# Patient Record
Sex: MALE | Race: WHITE | HISPANIC OR LATINO | Employment: PART TIME | RURAL
[De-identification: names, ages, dates, MRNs, and addresses within clinical notes are randomized per-mention and may not be internally consistent; named-entity substitution may affect disease eponyms.]

---

## 2024-04-23 ENCOUNTER — OFFICE VISIT (OUTPATIENT)
Dept: FAMILY MEDICINE CLINIC | Facility: CLINIC | Age: 29
End: 2024-04-23
Payer: COMMERCIAL

## 2024-04-23 VITALS
BODY MASS INDEX: 26.92 KG/M2 | DIASTOLIC BLOOD PRESSURE: 84 MMHG | WEIGHT: 177.6 LBS | OXYGEN SATURATION: 98 % | HEART RATE: 88 BPM | HEIGHT: 68 IN | TEMPERATURE: 98.2 F | SYSTOLIC BLOOD PRESSURE: 120 MMHG | RESPIRATION RATE: 18 BRPM

## 2024-04-23 DIAGNOSIS — J30.1 SEASONAL ALLERGIC RHINITIS DUE TO POLLEN: ICD-10-CM

## 2024-04-23 DIAGNOSIS — L91.8 SKIN TAG: ICD-10-CM

## 2024-04-23 DIAGNOSIS — J02.9 SORE THROAT: Primary | ICD-10-CM

## 2024-04-23 DIAGNOSIS — E66.3 OVERWEIGHT: ICD-10-CM

## 2024-04-23 DIAGNOSIS — Z76.89 ENCOUNTER TO ESTABLISH CARE: ICD-10-CM

## 2024-04-23 LAB
EXPIRATION DATE: NORMAL
INTERNAL CONTROL: NORMAL
Lab: NORMAL
S PYO AG THROAT QL: NEGATIVE

## 2024-04-23 PROCEDURE — 87880 STREP A ASSAY W/OPTIC: CPT | Performed by: NURSE PRACTITIONER

## 2024-04-23 PROCEDURE — 99204 OFFICE O/P NEW MOD 45 MIN: CPT | Performed by: NURSE PRACTITIONER

## 2024-04-23 RX ORDER — LORATADINE 10 MG/1
10 TABLET ORAL DAILY
Qty: 30 TABLET | Refills: 2 | Status: SHIPPED | OUTPATIENT
Start: 2024-04-23

## 2024-04-23 NOTE — PROGRESS NOTES
"    Office Note     Name: Oliverio Arreguin    : 1995     MRN: [id number]     Chief Complaint  Sore Throat    Subjective     History of Present Illness:  Oliverio Arreguin is a 28 y.o. male who presents today to [facility] care with complaints of sore throat. Patient notes several weeks of mild nasal congestion, sore throat, sneezing, itchy watery eyes. He states his symptoms did improve with daily Allegra but he is not taking consistently. His chief complaint is sore throat.  Patient also has interest in having removal of a skin tag that is very large, hanging from the medial aspect of his left upper extremity.  He states it has been there for several years, never been removed previously.  He has no further complaints or concerns    Review of Systems   Constitutional:  Negative for chills, fatigue and fever.   HENT:  Positive for postnasal drip, sneezing and sore throat.    Eyes:  Positive for itching. Negative for redness.   Respiratory:  Negative for cough, chest tightness and wheezing.    Cardiovascular:  Negative for chest pain, palpitations and leg swelling.   Gastrointestinal:  Negative for abdominal pain, constipation, diarrhea, nausea and vomiting.   Endocrine: Negative for polydipsia and polyuria.   Genitourinary:  Negative for difficulty urinating, frequency and hematuria.   Musculoskeletal:  Negative for myalgias.   Skin:  Positive for skin lesions.   Neurological:  Negative for dizziness, light-headedness and headache.       Objective     Past Medical History:   Diagnosis Date    Allergic      History reviewed. No pertinent surgical history.  History reviewed. No pertinent family history.    Vital Signs  /84 (BP Location: Left arm, Patient Position: Sitting, Cuff Size: Adult)   Pulse 88   Temp 98.2 °F (36.8 °C) (Temporal)   Resp 18   Ht 172.7 cm (68\")   Wt 80.6 kg (177 lb 9.6 oz)   SpO2 98%   BMI 27.00 kg/m²   Estimated body mass index is 27 kg/m² as calculated from the following:    Height as " "of this encounter: 172.7 cm (68\").    Weight as of this encounter: 80.6 kg (177 lb 9.6 oz).  Facility age limit for growth %zaynab is 20 years.    Physical Exam  Vitals reviewed.   Constitutional:       Appearance: Normal appearance.   HENT:      Head: Normocephalic and atraumatic.      Right Ear: Tympanic membrane, ear canal and external ear normal.      Left Ear: Tympanic membrane, ear canal and external ear normal.      Nose:      Right Turbinates: Swollen and pale.      Left Turbinates: Swollen and pale.      Mouth/Throat:      Pharynx: Oropharynx is clear. Posterior oropharyngeal erythema present.   Eyes:      Conjunctiva/sclera: Conjunctivae normal.      Pupils: Pupils are equal, round, and reactive to light.   Cardiovascular:      Rate and Rhythm: Normal rate and regular rhythm.      Pulses: Normal pulses.      Heart sounds: Normal heart sounds.   Pulmonary:      Effort: Pulmonary effort is normal.      Breath sounds: Normal breath sounds.   Abdominal:      General: Bowel sounds are normal.      Palpations: Abdomen is soft.   Musculoskeletal:      Cervical back: Neck supple.   Skin:     General: Skin is warm and dry.      Comments: Large skin tag noted at medial aspect of left upper extremity.   Neurological:      Mental Status: He is alert and oriented to person, place, and time.   Psychiatric:         Mood and Affect: Mood normal.         Behavior: Behavior normal.         Thought Content: Thought content normal.         Judgment: Judgment normal.          POCT Results (if applicable):  Results for orders placed or performed in visit on 04/23/24   POC Rapid Strep A    Specimen: Swab   Result Value Ref Range    Rapid Strep A Screen Negative Negative, VALID, INVALID, Not Performed    Internal Control Passed Passed    Lot Number [id number]     Expiration Date 11,022,026        Skin Tag Removal    Date/Time: 4/23/2024 3:57 PM    Performed by: Rula Nuno APRN  Authorized by: Rula Nuno APRN  " Preparation: Patient was prepped and draped in the usual sterile fashion.  Local anesthesia used: no    Anesthesia:  Local anesthesia used: no    Sedation:  Patient sedated: no    Patient tolerance: patient tolerated the procedure well with no immediate complications          Assessment and Plan     Diagnoses and all orders for this visit:    1. Sore throat (Primary)  -     POC Rapid Strep A    2. Overweight  Assessment & Plan:  Patient's (Body mass index is 27 kg/m².) indicates that they are overweight with health conditions that include none . Weight is unchanged. BMI is is above average; BMI management plan is completed. We discussed portion control and increasing exercise.       3. Seasonal allergic rhinitis due to pollen  Assessment & Plan:  Patient notes several weeks of mild nasal congestion, sore throat, sneezing, itchy watery eyes.  He states his symptoms did improve with daily Allegra but he is not taking consistently.  His chief complaint is sore throat.  He has tested negative for strep pharyngitis in office today.  Patient advised to start taking Allegra daily for the next 3 to 4 weeks during seasonal transition change and high pollen counts.  Also discussed benefits of nasal steroid Flonase or saline nasal spray.  Patient may find analgesia from sore throat through over-the-counter numbing agent such as Chloraseptic.    Orders:  -     loratadine (Claritin) 10 MG tablet; Take 1 tablet by mouth Daily.  Dispense: 30 tablet; Refill: 2    4. Encounter to establish care    5. Skin tag  Assessment & Plan:  Patient skin tag removed, tolerated procedure well.  Silver nitrate applied at removal site, covered with Band-Aid.      Other orders  -     Skin Tag Removal      BMI is >= 25 and <30. (Overweight) The following options were offered after discussion;: exercise counseling/recommendations and nutrition counseling/recommendations        Vaccine Counseling:  “Discussed risks/benefits to vaccination, reviewed  components of the vaccine, discussed VIS, discussed informed consent, informed consent obtained. Patient/Parent was allowed to accept or refuse vaccine. Questions answered to satisfactory state of patient/Parent. We reviewed typical age appropriate and seasonally appropriate vaccinations. Reviewed immunization history and updated state vaccination form as needed. Patient was counseled on Influenza  Tdap    Advanced Care Planning:   Patient does not have an advance directive, declines further assistance.    Follow Up  No follow-ups on file.    Rula Nuno, APRN

## 2024-04-23 NOTE — ASSESSMENT & PLAN NOTE
Patient skin tag removed, tolerated procedure well.  Silver nitrate applied at removal site, covered with Band-Aid.

## 2024-04-23 NOTE — ASSESSMENT & PLAN NOTE
Patient's (Body mass index is 27 kg/m².) indicates that they are overweight with health conditions that include none . Weight is unchanged. BMI is is above average; BMI management plan is completed. We discussed portion control and increasing exercise.

## 2024-04-23 NOTE — ASSESSMENT & PLAN NOTE
Patient notes several weeks of mild nasal congestion, sore throat, sneezing, itchy watery eyes.  He states his symptoms did improve with daily Allegra but he is not taking consistently.  His chief complaint is sore throat.  He has tested negative for strep pharyngitis in office today.  Patient advised to start taking Allegra daily for the next 3 to 4 weeks during seasonal transition change and high pollen counts.  Also discussed benefits of nasal steroid Flonase or saline nasal spray.  Patient may find analgesia from sore throat through over-the-counter numbing agent such as Chloraseptic.

## 2024-08-02 ENCOUNTER — OFFICE VISIT (OUTPATIENT)
Dept: FAMILY MEDICINE CLINIC | Facility: CLINIC | Age: 29
End: 2024-08-02
Payer: COMMERCIAL

## 2024-08-02 VITALS
SYSTOLIC BLOOD PRESSURE: 110 MMHG | BODY MASS INDEX: 27.13 KG/M2 | TEMPERATURE: 98.6 F | WEIGHT: 179 LBS | RESPIRATION RATE: 18 BRPM | OXYGEN SATURATION: 98 % | HEIGHT: 68 IN | DIASTOLIC BLOOD PRESSURE: 82 MMHG | HEART RATE: 80 BPM

## 2024-08-02 DIAGNOSIS — J30.1 SEASONAL ALLERGIC RHINITIS DUE TO POLLEN: ICD-10-CM

## 2024-08-02 DIAGNOSIS — J02.9 SORE THROAT: Primary | ICD-10-CM

## 2024-08-02 LAB
EXPIRATION DATE: NORMAL
INTERNAL CONTROL: NORMAL
Lab: NORMAL
S PYO AG THROAT QL: NEGATIVE

## 2024-08-02 PROCEDURE — 87880 STREP A ASSAY W/OPTIC: CPT | Performed by: NURSE PRACTITIONER

## 2024-08-02 PROCEDURE — 99213 OFFICE O/P EST LOW 20 MIN: CPT | Performed by: NURSE PRACTITIONER

## 2024-08-02 NOTE — PROGRESS NOTES
"    Office Note     Name: Oliverio Arreguin    : 1995     MRN: [id number]     Chief Complaint  Sore Throat    Subjective     History of Present Illness:  Oliverio Arreguin is a 28 y.o. male who presents today for complaints of sore throat.  He notes several weeks of sore throat, states that by the end of his work shift where he has to talk frequently over loud noises he feels as though his throat is very strained.  Patient states he does feel as though his he has had some recent postnasal drip.  Patient presented for the same complaints approximately 3 months ago at which time they responded very well to use of daily antihistamine and nasal steroid Flonase.  During that episode patient also was experiencing mild nasal congestion, sneezing and itchy watery eyes.  He does not have those accompanying symptoms during this episode.  She has not continued to utilize daily patient denies any hoarseness or loss of voice.  No fever, chills or fatigue.  He has no further complaints or concerns    Review of Systems   Constitutional:  Negative for appetite change, chills, fatigue and fever.   HENT:  Positive for sore throat. Negative for congestion and trouble swallowing.    Respiratory:  Positive for cough. Negative for chest tightness, shortness of breath and wheezing.    Cardiovascular:  Negative for chest pain, palpitations and leg swelling.   Gastrointestinal:  Negative for constipation, diarrhea, nausea, vomiting and GERD.   Endocrine: Negative for polydipsia and polyuria.   Neurological:  Negative for dizziness, weakness, light-headedness and headache.       Objective     Past Medical History:   Diagnosis Date    Allergic      History reviewed. No pertinent surgical history.  History reviewed. No pertinent family history.    Vital Signs  /82 (BP Location: Left arm, Patient Position: Sitting, Cuff Size: Adult)   Pulse 80   Temp 98.6 °F (37 °C) (Temporal)   Resp 18   Ht 172.7 cm (68\")   Wt 81.2 kg (179 lb)   SpO2 " "98%   BMI 27.22 kg/m²   Estimated body mass index is 27.22 kg/m² as calculated from the following:    Height as of this encounter: 172.7 cm (68\").    Weight as of this encounter: 81.2 kg (179 lb).  Facility age limit for growth %zaynab is 20 years.    Physical Exam  Constitutional:       Appearance: Normal appearance.   HENT:      Head: Normocephalic and atraumatic.      Right Ear: Ear canal and external ear normal.      Left Ear: Ear canal and external ear normal.      Ears:      Comments: Bilateral serous effusions noted     Nose:      Right Turbinates: Swollen and pale.      Left Turbinates: Swollen and pale.      Mouth/Throat:      Pharynx: Posterior oropharyngeal erythema present.   Eyes:      Conjunctiva/sclera: Conjunctivae normal.      Pupils: Pupils are equal, round, and reactive to light.   Cardiovascular:      Rate and Rhythm: Normal rate and regular rhythm.      Pulses: Normal pulses.      Heart sounds: Normal heart sounds.   Pulmonary:      Effort: Pulmonary effort is normal.      Breath sounds: Normal breath sounds.   Musculoskeletal:      Cervical back: Neck supple.   Skin:     General: Skin is warm and dry.   Neurological:      Mental Status: He is alert and oriented to person, place, and time.             POCT Results (if applicable):  Results for orders placed or performed in visit on 08/02/24   POC Rapid Strep A    Specimen: Swab   Result Value Ref Range    Rapid Strep A Screen Negative Negative, VALID, INVALID, Not Performed    Internal Control Passed Passed    Lot Number [id number]     Expiration Date 11,022,026             Assessment and Plan     Diagnoses and all orders for this visit:    1. Sore throat (Primary)  -     POC Rapid Strep A    2. Seasonal allergic rhinitis due to pollen  Assessment & Plan:  presents today for complaints of sore throat.  He notes several weeks of sore throat, states that by the end of his work shift where he has to talk frequently over loud noises he feels as though " his throat is very strained.  Patient states he does feel as though his he has had some recent postnasal drip.  Patient presented for the same complaints approximately 3 months ago at which time they responded very well to use of daily antihistamine and nasal steroid Flonase.  During that episode patient also was experiencing mild nasal congestion, sneezing and itchy watery eyes.  He does not have those accompanying symptoms during this episode.  Patient denies any hoarseness or loss of voice.  No fever, chills or fatigue.  Testing negative for strep in office today.  Physical exam findings consistent with recurrence of allergic rhinitis.  Patient advised to resume daily use of antihistamine and saline nasal spray or Flonase.  We also discussed use of salt water gargle.  He is to advise if no improvement             Follow Up  No follow-ups on file.    NESS Samson

## 2024-08-02 NOTE — ASSESSMENT & PLAN NOTE
presents today for complaints of sore throat.  He notes several weeks of sore throat, states that by the end of his work shift where he has to talk frequently over loud noises he feels as though his throat is very strained.  Patient states he does feel as though his he has had some recent postnasal drip.  Patient presented for the same complaints approximately 3 months ago at which time they responded very well to use of daily antihistamine and nasal steroid Flonase.  During that episode patient also was experiencing mild nasal congestion, sneezing and itchy watery eyes.  He does not have those accompanying symptoms during this episode.  Patient denies any hoarseness or loss of voice.  No fever, chills or fatigue.  Testing negative for strep in office today.  Physical exam findings consistent with recurrence of allergic rhinitis.  Patient advised to resume daily use of antihistamine and saline nasal spray or Flonase.  We also discussed use of salt water gargle.  He is to advise if no improvement

## 2024-09-04 ENCOUNTER — OFFICE VISIT (OUTPATIENT)
Dept: FAMILY MEDICINE CLINIC | Facility: CLINIC | Age: 29
End: 2024-09-04
Payer: COMMERCIAL

## 2024-09-04 VITALS
TEMPERATURE: 98.2 F | WEIGHT: 179 LBS | BODY MASS INDEX: 27.13 KG/M2 | DIASTOLIC BLOOD PRESSURE: 74 MMHG | RESPIRATION RATE: 18 BRPM | OXYGEN SATURATION: 98 % | SYSTOLIC BLOOD PRESSURE: 106 MMHG | HEIGHT: 68 IN | HEART RATE: 69 BPM

## 2024-09-04 DIAGNOSIS — K21.9 GASTROESOPHAGEAL REFLUX DISEASE WITHOUT ESOPHAGITIS: ICD-10-CM

## 2024-09-04 DIAGNOSIS — R49.8 VOICE STRAIN: ICD-10-CM

## 2024-09-04 DIAGNOSIS — E66.3 OVERWEIGHT: ICD-10-CM

## 2024-09-04 DIAGNOSIS — R49.0 HOARSENESS: Primary | ICD-10-CM

## 2024-09-04 DIAGNOSIS — J30.1 SEASONAL ALLERGIC RHINITIS DUE TO POLLEN: ICD-10-CM

## 2024-09-04 PROBLEM — S63.501A RIGHT WRIST SPRAIN: Status: ACTIVE | Noted: 2024-09-04

## 2024-09-04 PROCEDURE — 99214 OFFICE O/P EST MOD 30 MIN: CPT | Performed by: NURSE PRACTITIONER

## 2024-09-04 NOTE — PROGRESS NOTES
"    Office Note     Name: Oliverio Arreguin    : 1995     MRN: [id number]     Chief Complaint  follow up throat    Subjective     History of Present Illness:  Oliverio Arreguin is a 28 y.o. male who presents today for ongoing complaints with his throat. Patient initially presented five months ago, 2024, with complaints of sore throat. At that time he had additional complaints of mild nasal congestion, sneezing and itchy, watery eyes that had been present several weeks prior to presentation. Those symptoms did improve with use of daily allegra but he was not taking consistently. He presented again in 2024 with complaints of sore throat but stated by the end of his work shift his throat and voice felt very \"strained\". He made note that during work hours his frequently has to talk over loud noises. He had again not been consistent with daily anithistamine and was advised to resume. Today patient returns with ongoing complaints of hoarsesness and feelings of voice strain. Patient has never been a smoker. He denies cough, congestion. He does feel as though he has neck tenderness at times. No further complaints or concerns.     Review of Systems   Constitutional:  Negative for chills, fatigue and fever.   HENT:  Positive for sore throat. Negative for congestion, facial swelling, postnasal drip, rhinorrhea, sneezing, swollen glands and trouble swallowing.    Eyes:  Negative for itching.   Respiratory:  Negative for cough, chest tightness and wheezing.    Cardiovascular:  Negative for chest pain, palpitations and leg swelling.   Gastrointestinal:  Negative for abdominal pain, nausea, GERD and indigestion.   Musculoskeletal:  Negative for arthralgias and myalgias.   Neurological:  Negative for dizziness, weakness, light-headedness and headache.       Objective     Past Medical History:   Diagnosis Date    Allergic      History reviewed. No pertinent surgical history.  History reviewed. No pertinent family " "history.    Vital Signs  /74 (BP Location: Left arm, Patient Position: Sitting, Cuff Size: Adult)   Pulse 69   Temp 98.2 °F (36.8 °C) (Temporal)   Resp 18   Ht 172.7 cm (68\")   Wt 81.2 kg (179 lb)   SpO2 98%   BMI 27.22 kg/m²   Estimated body mass index is 27.22 kg/m² as calculated from the following:    Height as of this encounter: 172.7 cm (68\").    Weight as of this encounter: 81.2 kg (179 lb).  Facility age limit for growth %zaynab is 20 years.    Physical Exam  Vitals reviewed.   Constitutional:       Appearance: Normal appearance.   HENT:      Head: Normocephalic and atraumatic.      Right Ear: Tympanic membrane and ear canal normal.      Left Ear: Tympanic membrane, ear canal and external ear normal.      Nose: Nose normal.      Mouth/Throat:      Mouth: Mucous membranes are moist.      Pharynx: Oropharynx is clear. No oropharyngeal exudate or posterior oropharyngeal erythema.   Eyes:      Conjunctiva/sclera: Conjunctivae normal.   Cardiovascular:      Rate and Rhythm: Normal rate and regular rhythm.      Pulses: Normal pulses.      Heart sounds: Normal heart sounds.   Musculoskeletal:      Cervical back: Neck supple.   Skin:     General: Skin is dry.   Neurological:      Mental Status: He is alert and oriented to person, place, and time.          POCT Results (if applicable):  Results for orders placed or performed in visit on 08/02/24   POC Rapid Strep A    Specimen: Swab   Result Value Ref Range    Rapid Strep A Screen Negative Negative, VALID, INVALID, Not Performed    Internal Control Passed Passed    Lot Number [id number]     Expiration Date 11,022,026             Assessment and Plan     Diagnoses and all orders for this visit:    1. Hoarseness (Primary)  -     Ambulatory Referral to ENT (Otolaryngology)    2. Gastroesophageal reflux disease without esophagitis  Assessment & Plan:  Patient with feelings of reflux, heartburn, particularly after eating spicy foods. Patient feels this development " "is more recent and was not present when his issues with his throat began. Will initiate omeprazole 20mg daily      3. Seasonal allergic rhinitis due to pollen  Assessment & Plan:  Symptoms respond well to use of allegra and flonase though he does not use consistently.       4. Voice strain  Assessment & Plan:  presents today for ongoing complaints with his throat. Patient initially presented five months ago, April 2024, with complaints of sore throat. At that time he had additional complaints of mild nasal congestion, sneezing and itchy, watery eyes that had been present several weeks prior to presentation. Those symptoms did improve with use of daily allegra but he was not taking consistently. He presented again in August 2024 with complaints of sore throat but stated by the end of his work shift his throat and voice felt very \"strained\". He made note that during work hours his frequently has to talk over loud noises. He had again not been consistent with daily anithistamine and was advised to resume. Today patient returns with ongoing complaints of hoarsesness and feelings of voice strain. Patient has never been a smoker. He denies cough, congestion. He does feel as though he has neck tenderness at times.No abnormal physical exam findings in office today  -Refer to ENT for further evaluation      5. Overweight  Assessment & Plan:  Patient's (Body mass index is 27.22 kg/m².) indicates that they are overweight with health conditions that include none . Weight is unchanged. BMI is above average; BMI management plan is completed. We discussed portion control and increasing exercise.       Other orders  -     omeprazole (priLOSEC) 20 MG capsule; Take 1 capsule by mouth Daily.  Dispense: 90 capsule; Refill: 1           Follow Up  No follow-ups on file.    Rula Nuno, APRN  "

## 2024-09-08 PROBLEM — R49.8 VOICE STRAIN: Status: ACTIVE | Noted: 2024-09-08

## 2024-09-08 NOTE — ASSESSMENT & PLAN NOTE
"presents today for ongoing complaints with his throat. Patient initially presented five months ago, April 2024, with complaints of sore throat. At that time he had additional complaints of mild nasal congestion, sneezing and itchy, watery eyes that had been present several weeks prior to presentation. Those symptoms did improve with use of daily allegra but he was not taking consistently. He presented again in August 2024 with complaints of sore throat but stated by the end of his work shift his throat and voice felt very \"strained\". He made note that during work hours his frequently has to talk over loud noises. He had again not been consistent with daily anithistamine and was advised to resume. Today patient returns with ongoing complaints of hoarsesness and feelings of voice strain. Patient has never been a smoker. He denies cough, congestion. He does feel as though he has neck tenderness at times.No abnormal physical exam findings in office today  -Refer to ENT for further evaluation  "

## 2024-09-08 NOTE — ASSESSMENT & PLAN NOTE
Patient with feelings of reflux, heartburn, particularly after eating spicy foods. Patient feels this development is more recent and was not present when his issues with his throat began. Will initiate omeprazole 20mg daily

## 2024-09-08 NOTE — ASSESSMENT & PLAN NOTE
Patient's (Body mass index is 27.22 kg/m².) indicates that they are overweight with health conditions that include none . Weight is unchanged. BMI is above average; BMI management plan is completed. We discussed portion control and increasing exercise.

## 2024-09-25 ENCOUNTER — OFFICE VISIT (OUTPATIENT)
Dept: FAMILY MEDICINE CLINIC | Facility: CLINIC | Age: 29
End: 2024-09-25
Payer: COMMERCIAL

## 2024-09-25 VITALS
TEMPERATURE: 98.3 F | BODY MASS INDEX: 27.19 KG/M2 | DIASTOLIC BLOOD PRESSURE: 80 MMHG | HEIGHT: 68 IN | WEIGHT: 179.4 LBS | HEART RATE: 64 BPM | RESPIRATION RATE: 18 BRPM | OXYGEN SATURATION: 98 % | SYSTOLIC BLOOD PRESSURE: 122 MMHG

## 2024-09-25 DIAGNOSIS — Z11.59 NEED FOR HEPATITIS C SCREENING TEST: ICD-10-CM

## 2024-09-25 DIAGNOSIS — E66.3 OVERWEIGHT: ICD-10-CM

## 2024-09-25 DIAGNOSIS — Z00.00 ANNUAL PHYSICAL EXAM: Primary | ICD-10-CM

## 2024-09-25 DIAGNOSIS — K21.9 GASTROESOPHAGEAL REFLUX DISEASE WITHOUT ESOPHAGITIS: ICD-10-CM

## 2024-09-25 DIAGNOSIS — R49.8 VOCAL FATIGUE: ICD-10-CM

## 2024-09-25 DIAGNOSIS — J30.1 SEASONAL ALLERGIC RHINITIS DUE TO POLLEN: ICD-10-CM

## 2024-09-25 PROCEDURE — 99395 PREV VISIT EST AGE 18-39: CPT | Performed by: NURSE PRACTITIONER

## 2024-09-26 LAB
ALBUMIN SERPL-MCNC: 4.4 G/DL (ref 4.3–5.2)
ALP SERPL-CCNC: 95 IU/L (ref 44–121)
ALT SERPL-CCNC: 23 IU/L (ref 0–44)
AST SERPL-CCNC: 19 IU/L (ref 0–40)
BASOPHILS # BLD AUTO: 0 X10E3/UL (ref 0–0.2)
BASOPHILS NFR BLD AUTO: 1 %
BILIRUB SERPL-MCNC: 0.3 MG/DL (ref 0–1.2)
BUN SERPL-MCNC: 21 MG/DL (ref 6–20)
BUN/CREAT SERPL: 20 (ref 9–20)
CALCIUM SERPL-MCNC: 9.3 MG/DL (ref 8.7–10.2)
CHLORIDE SERPL-SCNC: 102 MMOL/L (ref 96–106)
CHOLEST SERPL-MCNC: 185 MG/DL (ref 100–199)
CO2 SERPL-SCNC: 21 MMOL/L (ref 20–29)
CREAT SERPL-MCNC: 1.07 MG/DL (ref 0.76–1.27)
EGFRCR SERPLBLD CKD-EPI 2021: 97 ML/MIN/1.73
EOSINOPHIL # BLD AUTO: 0.2 X10E3/UL (ref 0–0.4)
EOSINOPHIL NFR BLD AUTO: 3 %
ERYTHROCYTE [DISTWIDTH] IN BLOOD BY AUTOMATED COUNT: 12.9 % (ref 11.6–15.4)
GLOBULIN SER CALC-MCNC: 2.9 G/DL (ref 1.5–4.5)
GLUCOSE SERPL-MCNC: 93 MG/DL (ref 70–99)
HCT VFR BLD AUTO: 45.6 % (ref 37.5–51)
HCV IGG SERPL QL IA: NON REACTIVE
HDLC SERPL-MCNC: 46 MG/DL
HGB BLD-MCNC: 15 G/DL (ref 13–17.7)
IMM GRANULOCYTES # BLD AUTO: 0 X10E3/UL (ref 0–0.1)
IMM GRANULOCYTES NFR BLD AUTO: 0 %
LDLC SERPL CALC-MCNC: 107 MG/DL (ref 0–99)
LYMPHOCYTES # BLD AUTO: 2.5 X10E3/UL (ref 0.7–3.1)
LYMPHOCYTES NFR BLD AUTO: 36 %
MCH RBC QN AUTO: 29.9 PG (ref 26.6–33)
MCHC RBC AUTO-ENTMCNC: 32.9 G/DL (ref 31.5–35.7)
MCV RBC AUTO: 91 FL (ref 79–97)
MONOCYTES # BLD AUTO: 0.6 X10E3/UL (ref 0.1–0.9)
MONOCYTES NFR BLD AUTO: 9 %
NEUTROPHILS # BLD AUTO: 3.6 X10E3/UL (ref 1.4–7)
NEUTROPHILS NFR BLD AUTO: 51 %
PLATELET # BLD AUTO: 186 X10E3/UL (ref 150–450)
POTASSIUM SERPL-SCNC: 4 MMOL/L (ref 3.5–5.2)
PROT SERPL-MCNC: 7.3 G/DL (ref 6–8.5)
RBC # BLD AUTO: 5.01 X10E6/UL (ref 4.14–5.8)
SODIUM SERPL-SCNC: 138 MMOL/L (ref 134–144)
TRIGL SERPL-MCNC: 184 MG/DL (ref 0–149)
TSH SERPL DL<=0.005 MIU/L-ACNC: 2.61 UIU/ML (ref 0.45–4.5)
VLDLC SERPL CALC-MCNC: 32 MG/DL (ref 5–40)
WBC # BLD AUTO: 6.9 X10E3/UL (ref 3.4–10.8)

## 2024-09-27 ENCOUNTER — TELEPHONE (OUTPATIENT)
Dept: FAMILY MEDICINE CLINIC | Facility: CLINIC | Age: 29
End: 2024-09-27
Payer: COMMERCIAL